# Patient Record
Sex: MALE | Race: WHITE | NOT HISPANIC OR LATINO | URBAN - METROPOLITAN AREA
[De-identification: names, ages, dates, MRNs, and addresses within clinical notes are randomized per-mention and may not be internally consistent; named-entity substitution may affect disease eponyms.]

---

## 2022-11-02 ENCOUNTER — WEB ENCOUNTER (OUTPATIENT)
Dept: URBAN - METROPOLITAN AREA CLINIC 63 | Facility: CLINIC | Age: 47
End: 2022-11-02

## 2022-11-02 ENCOUNTER — OFFICE VISIT (OUTPATIENT)
Dept: URBAN - METROPOLITAN AREA CLINIC 63 | Facility: CLINIC | Age: 47
End: 2022-11-02
Payer: COMMERCIAL

## 2022-11-02 VITALS
WEIGHT: 242 LBS | HEIGHT: 69 IN | HEART RATE: 84 BPM | TEMPERATURE: 98 F | DIASTOLIC BLOOD PRESSURE: 74 MMHG | BODY MASS INDEX: 35.84 KG/M2 | SYSTOLIC BLOOD PRESSURE: 130 MMHG | OXYGEN SATURATION: 97 %

## 2022-11-02 DIAGNOSIS — K50.90 CROHN'S DISEASE, UNSPECIFIED, WITHOUT COMPLICATIONS: ICD-10-CM

## 2022-11-02 PROCEDURE — 99204 OFFICE O/P NEW MOD 45 MIN: CPT | Performed by: NURSE PRACTITIONER

## 2022-11-02 RX ORDER — TRAZODONE HYDROCHLORIDE 150 MG/1
1 TABLET AT BEDTIME TABLET ORAL ONCE A DAY
Status: ACTIVE | COMMUNITY

## 2022-11-02 RX ORDER — FAMOTIDINE 20 MG/1
1 TABLET AT BEDTIME AS NEEDED TABLET, FILM COATED ORAL ONCE A DAY
Status: ACTIVE | COMMUNITY

## 2022-11-02 RX ORDER — ESOMEPRAZOLE MAGNESIUM 40 MG/1
1 CAPSULE CAPSULE, DELAYED RELEASE ORAL ONCE A DAY
Status: ACTIVE | COMMUNITY

## 2022-11-02 RX ORDER — INFLIXIMAB 100 MG/10ML
AS DIRECTED INJECTION, POWDER, LYOPHILIZED, FOR SOLUTION INTRAVENOUS
Status: ACTIVE | COMMUNITY

## 2022-11-02 NOTE — HPI-TODAY'S VISIT:
Thank you very much for kindly referring Yash Silverio, very pleasant 47-year-old male, to our service for Crohn's disease.  Past medical history significant for Crohn's disease, GERD and obesity.  Past surgical history significant for cervical fusion, shoulder surgery and his last complete colonoscopy was 2019.  His Crohn's is currently maintained with Remicade every 8 weeks and he is not sure of the dose.  Yash presents today having permanently relocated to this area and is wanting to establish with gastroenterology for ongoing treatment of Crohn's disease.  His last infusion was 19 August 2022 and he believes he is approximately 2 weeks past due.  Unfortunately, I do not have any medical records for review at the time of this office visit.  He was originally diagnosed with Crohn's in 2003 and states he had involvement, "from my mouth to anus", and was hospitalized at initial presentation and relates he also had SIBO.  He was started on Remicade and has been on it for the past 17 years with good efficacy.  He states his last flare was 2004, which also resulted in hospitalization, and symptoms consisted of chronic dyspepsia, diarrhea and bloody stools.  Today, he is asymptomatic from a GI perspective and relates 1-2 unremarkable bowel movements per day, typically in the morning after coffee.  He uses Nexium, 40 mg once daily with good efficacy and denies pyrosis, dysphagia, dyspepsia, abdominal pain, change in bowel habits, rectal bleeding, melena or unintentional weight loss.  Current regimen: Remicade, every 8 weeks Crohn's (2003) Last infusion, 19 AUG 2022

## 2022-11-03 PROBLEM — 34000006 CROHN'S DISEASE: Status: ACTIVE | Noted: 2022-11-02

## 2022-11-08 ENCOUNTER — TELEPHONE ENCOUNTER (OUTPATIENT)
Dept: URBAN - METROPOLITAN AREA CLINIC 23 | Facility: CLINIC | Age: 47
End: 2022-11-08

## 2022-11-20 LAB
A/G RATIO: 1.7
ALBUMIN: 4.7
ALKALINE PHOSPHATASE: 57
ALT (SGPT): 21
AST (SGOT): 16
BILIRUBIN, TOTAL: 0.5
BUN/CREATININE RATIO: (no result)
BUN: 13
C-REACTIVE PROTEIN, QUANT: 6.5
CALCIUM: 9.7
CARBON DIOXIDE, TOTAL: 23
CHLORIDE: 101
CREATININE: 1.08
EGFR: 86
GLOBULIN, TOTAL: 2.8
GLUCOSE: 90
HEMATOCRIT: 45.9
HEMOGLOBIN: 15.2
HEPATITIS B SURFACE ANTIGEN: (no result)
MCH: 27.4
MCHC: 33.1
MCV: 82.7
MITOGEN-NIL: >10
MPV: 9.4
NIL: 0.01
PLATELET COUNT: 267
POTASSIUM: 3.9
PROTEIN, TOTAL: 7.5
QUANTIFERON(R)-TB GOLD: NEGATIVE
RDW: 14.5
RED BLOOD CELL COUNT: 5.55
SODIUM: 138
TB1-NIL: 0
TB2-NIL: 0.01
WHITE BLOOD CELL COUNT: 12.8

## 2022-12-02 ENCOUNTER — OFFICE VISIT (OUTPATIENT)
Dept: URBAN - METROPOLITAN AREA CLINIC 63 | Facility: CLINIC | Age: 47
End: 2022-12-02

## 2022-12-08 ENCOUNTER — OFFICE VISIT (OUTPATIENT)
Dept: URBAN - METROPOLITAN AREA CLINIC 60 | Facility: CLINIC | Age: 47
End: 2022-12-08

## 2022-12-08 RX ORDER — TRAZODONE HYDROCHLORIDE 150 MG/1
1 TABLET AT BEDTIME TABLET ORAL ONCE A DAY
COMMUNITY

## 2022-12-08 RX ORDER — INFLIXIMAB 100 MG/10ML
AS DIRECTED INJECTION, POWDER, LYOPHILIZED, FOR SOLUTION INTRAVENOUS
COMMUNITY

## 2022-12-08 RX ORDER — ESOMEPRAZOLE MAGNESIUM 40 MG/1
1 CAPSULE CAPSULE, DELAYED RELEASE ORAL ONCE A DAY
COMMUNITY

## 2022-12-08 RX ORDER — FAMOTIDINE 20 MG/1
1 TABLET AT BEDTIME AS NEEDED TABLET, FILM COATED ORAL ONCE A DAY
COMMUNITY

## 2022-12-08 NOTE — HPI-PREVIOUS LABS
Lab work dated 20 November 2022 demonstrates the following abnormalities: WBC 12.8.  All remaining lab values of CBC, CMP, CRP (6.5), QuantiFERON-TB Gold and HBV surface antigen are negative, nonreactive or within normal limits.  Full results listed below.

## 2022-12-08 NOTE — HPI-TODAY'S VISIT:
Thank you very much for kindly referring Yash Silverio, very pleasant 47-year-old male, to our service for Crohn's disease.  Past medical history significant for Crohn's disease, GERD and obesity.  Past surgical history significant for cervical fusion, shoulder surgery and his last complete colonoscopy was 2019.  His Crohn's is currently maintained with Remicade every 8 weeks and he is not sure of the dose.  Yash presents today having permanently relocated to this area and is wanting to establish with gastroenterology for ongoing treatment of Crohn's disease.  His last infusion was 19 August 2022 and he believes he is approximately 2 weeks past due.  Unfortunately, I do not have any medical records for review at the time of this office visit.  He was originally diagnosed with Crohn's in 2003 and states he had involvement, "from my mouth to anus", and was hospitalized at initial presentation and relates he also had SIBO.  He was started on Remicade and has been on it for the past 17 years with good efficacy.  He states his last flare was 2004, which also resulted in hospitalization, and symptoms consisted of chronic dyspepsia, diarrhea and bloody stools.  Today, he is asymptomatic from a GI perspective and relates 1-2 unremarkable bowel movements per day, typically in the morning after coffee.  He uses Nexium, 40 mg once daily with good efficacy and denies pyrosis, dysphagia, dyspepsia, abdominal pain, change in bowel habits, rectal bleeding, melena or unintentional weight loss.  Current regimen: Remicade, every 8 weeks Crohn's (2003) Last infusion, 19 AUG 2022 .

## 2022-12-19 ENCOUNTER — OFFICE VISIT (OUTPATIENT)
Dept: URBAN - METROPOLITAN AREA CLINIC 63 | Facility: CLINIC | Age: 47
End: 2022-12-19

## 2022-12-19 RX ORDER — TRAZODONE HYDROCHLORIDE 150 MG/1
1 TABLET AT BEDTIME TABLET ORAL ONCE A DAY
COMMUNITY

## 2022-12-19 RX ORDER — ESOMEPRAZOLE MAGNESIUM 40 MG/1
1 CAPSULE CAPSULE, DELAYED RELEASE ORAL ONCE A DAY
COMMUNITY

## 2022-12-19 RX ORDER — FAMOTIDINE 20 MG/1
1 TABLET AT BEDTIME AS NEEDED TABLET, FILM COATED ORAL ONCE A DAY
COMMUNITY

## 2022-12-19 RX ORDER — INFLIXIMAB 100 MG/10ML
AS DIRECTED INJECTION, POWDER, LYOPHILIZED, FOR SOLUTION INTRAVENOUS
COMMUNITY

## 2022-12-19 NOTE — HPI-PREVIOUS LABS
Lab work dated 14 November 2022 demonstrates the following abnormalities: WBC 12.8.  All remaining lab values of CBC, CMP, CRP (6.5), QuantiFERON-TB gold plus and HBV surface antigen are negative or within normal limits (see full results below).

## 2022-12-19 NOTE — HPI-TODAY'S VISIT:
OLD: Thank you very much for kindly referring Yash Silverio, very pleasant 47-year-old male, to our service for Crohn's disease.  Past medical history significant for Crohn's disease, GERD and obesity.  Past surgical history significant for cervical fusion, shoulder surgery and his last complete colonoscopy was 2019.  His Crohn's is currently maintained with Remicade every 8 weeks and he is not sure of the dose.  Yash presents today having permanently relocated to this area and is wanting to establish with gastroenterology for ongoing treatment of Crohn's disease.  His last infusion was 19 August 2022 and he believes he is approximately 2 weeks past due.  Unfortunately, I do not have any medical records for review at the time of this office visit.  He was originally diagnosed with Crohn's in 2003 and states he had involvement, "from my mouth to anus", and was hospitalized at initial presentation and relates he also had SIBO.  He was started on Remicade and has been on it for the past 17 years with good efficacy.  He states his last flare was 2004, which also resulted in hospitalization, and symptoms consisted of chronic dyspepsia, diarrhea and bloody stools.  Today, he is asymptomatic from a GI perspective and relates 1-2 unremarkable bowel movements per day, typically in the morning after coffee.  He uses Nexium, 40 mg once daily with good efficacy and denies pyrosis, dysphagia, dyspepsia, abdominal pain, change in bowel habits, rectal bleeding, melena or unintentional weight loss.  Current regimen: Remicade, 5 mg/kg, every 8 weeks Crohn's (2003) Last infusion, 19 AUG 2022 . .

## 2023-01-18 ENCOUNTER — TELEPHONE ENCOUNTER (OUTPATIENT)
Dept: URBAN - METROPOLITAN AREA CLINIC 63 | Facility: CLINIC | Age: 48
End: 2023-01-18

## 2023-07-13 ENCOUNTER — TELEPHONE ENCOUNTER (OUTPATIENT)
Dept: URBAN - METROPOLITAN AREA CLINIC 64 | Facility: CLINIC | Age: 48
End: 2023-07-13

## 2023-08-14 ENCOUNTER — OFFICE VISIT (OUTPATIENT)
Dept: URBAN - METROPOLITAN AREA CLINIC 63 | Facility: CLINIC | Age: 48
End: 2023-08-14

## 2023-08-14 RX ORDER — ESOMEPRAZOLE MAGNESIUM 40 MG/1
1 CAPSULE CAPSULE, DELAYED RELEASE ORAL ONCE A DAY
COMMUNITY

## 2023-08-14 RX ORDER — FAMOTIDINE 20 MG/1
1 TABLET AT BEDTIME AS NEEDED TABLET, FILM COATED ORAL ONCE A DAY
COMMUNITY

## 2023-08-14 RX ORDER — TRAZODONE HYDROCHLORIDE 150 MG/1
1 TABLET AT BEDTIME TABLET ORAL ONCE A DAY
COMMUNITY

## 2023-08-14 RX ORDER — INFLIXIMAB 100 MG/10ML
AS DIRECTED INJECTION, POWDER, LYOPHILIZED, FOR SOLUTION INTRAVENOUS
COMMUNITY

## 2023-08-14 NOTE — HPI-TODAY'S VISIT:
OLD: Thank you very much for kindly referring Yash Silverio, very pleasant 47-year-old male, to our service for Crohn's disease.  Past medical history significant for Crohn's disease, GERD and obesity.  Past surgical history significant for cervical fusion, shoulder surgery and his last complete colonoscopy was 2019.  His Crohn's is currently maintained with Remicade every 8 weeks and he is not sure of the dose.  Yash presents today having permanently relocated to this area and is wanting to establish with gastroenterology for ongoing treatment of Crohn's disease.  His last infusion was 19 August 2022 and he believes he is approximately 2 weeks past due.  Unfortunately, I do not have any medical records for review at the time of this office visit.  He was originally diagnosed with Crohn's in 2003 and states he had involvement, "from my mouth to anus", and was hospitalized at initial presentation and relates he also had SIBO.  He was started on Remicade and has been on it for the past 17 years with good efficacy.  He states his last flare was 2004, which also resulted in hospitalization, and symptoms consisted of chronic dyspepsia, diarrhea and bloody stools.  Today, he is asymptomatic from a GI perspective and relates 1-2 unremarkable bowel movements per day, typically in the morning after coffee.  He uses Nexium, 40 mg once daily with good efficacy and denies pyrosis, dysphagia, dyspepsia, abdominal pain, change in bowel habits, rectal bleeding, melena or unintentional weight loss.  Current regimen: Remicade, 5 mg/kg, every 8 weeks Crohn's (2003) Last infusion, 19 AUG 2022 . . .

## 2023-08-14 NOTE — HPI-PREVIOUS LABS
Lab work dated 14 November 2022 demonstrates the following abnormalities: WBC 12.8.  All remaining lab values of CBC, CMP, CRP (6.5), QuantiFERON-TB gold plus and HBV surface antigen are negative or within normal limits (see full results below). .

## 2023-09-06 ENCOUNTER — LAB OUTSIDE AN ENCOUNTER (OUTPATIENT)
Dept: URBAN - METROPOLITAN AREA CLINIC 63 | Facility: CLINIC | Age: 48
End: 2023-09-06

## 2023-09-07 LAB
A/G RATIO: 1.9
ABSOLUTE BASOPHILS: 56
ABSOLUTE EOSINOPHILS: 89
ABSOLUTE LYMPHOCYTES: 3064
ABSOLUTE MONOCYTES: 633
ABSOLUTE NEUTROPHILS: 7259
ALBUMIN: 4.3
ALKALINE PHOSPHATASE: 52
ALT (SGPT): 15
AST (SGOT): 14
BASOPHILS: 0.5
BILIRUBIN, TOTAL: 0.4
BUN/CREATININE RATIO: (no result)
BUN: 15
C-REACTIVE PROTEIN, QUANT: 2.9
CALCIUM: 9.3
CARBON DIOXIDE, TOTAL: 27
CHLORIDE: 103
CREATININE: 1.09
EGFR: 84
EOSINOPHILS: 0.8
GLOBULIN, TOTAL: 2.3
GLUCOSE: 167
HEMATOCRIT: 42.7
HEMOGLOBIN: 14.7
LYMPHOCYTES: 27.6
MCH: 29.2
MCHC: 34.4
MCV: 84.7
MONOCYTES: 5.7
MPV: 9.3
NEUTROPHILS: 65.4
PLATELET COUNT: 239
POTASSIUM: 3.6
PROTEIN, TOTAL: 6.6
RDW: 14.9
RED BLOOD CELL COUNT: 5.04
SODIUM: 137
WHITE BLOOD CELL COUNT: 11.1

## 2023-09-21 LAB — CALPROTECTIN, FECAL: 37

## 2023-10-09 ENCOUNTER — OFFICE VISIT (OUTPATIENT)
Dept: URBAN - METROPOLITAN AREA CLINIC 63 | Facility: CLINIC | Age: 48
End: 2023-10-09
Payer: COMMERCIAL

## 2023-10-09 VITALS
OXYGEN SATURATION: 97 % | HEART RATE: 80 BPM | TEMPERATURE: 98.3 F | WEIGHT: 229.8 LBS | DIASTOLIC BLOOD PRESSURE: 82 MMHG | BODY MASS INDEX: 34.04 KG/M2 | SYSTOLIC BLOOD PRESSURE: 124 MMHG | HEIGHT: 69 IN

## 2023-10-09 DIAGNOSIS — K50.90 CROHN'S DISEASE, UNSPECIFIED, WITHOUT COMPLICATIONS: ICD-10-CM

## 2023-10-09 PROCEDURE — 99214 OFFICE O/P EST MOD 30 MIN: CPT | Performed by: NURSE PRACTITIONER

## 2023-10-09 RX ORDER — ESOMEPRAZOLE MAGNESIUM 40 MG/1
1 CAPSULE CAPSULE, DELAYED RELEASE ORAL ONCE A DAY
Status: ACTIVE | COMMUNITY

## 2023-10-09 RX ORDER — INFLIXIMAB 100 MG/10ML
AS DIRECTED INJECTION, POWDER, LYOPHILIZED, FOR SOLUTION INTRAVENOUS
Status: ACTIVE | COMMUNITY

## 2023-10-09 RX ORDER — ONDANSETRON HYDROCHLORIDE 4 MG/1
1 TABLET TABLET, FILM COATED ORAL
Qty: 2 | Refills: 0 | OUTPATIENT
Start: 2023-10-09

## 2023-10-09 RX ORDER — TRAZODONE HYDROCHLORIDE 150 MG/1
1 TABLET AT BEDTIME TABLET ORAL ONCE A DAY
Status: ACTIVE | COMMUNITY

## 2023-10-09 NOTE — HPI-PREVIOUS IMAGING
CT abdomen and pelvis with contrast/29 November 2022. 1. Severe acute diverticulitis of the descending colon. No extraluminal gas or abscess. 2. Indeterminate nodular hyperattenuating focus associated with the skin in the supraumbilical portion of the anterior abdominal wall, for which correlation with direct inspection is requested.

## 2023-10-09 NOTE — HPI-PREVIOUS LABS
Lab work dated 06 September 2023 demonstrates the following abnormalities: Glucose 167.  All remaining lab values of CMP and CRP (2.9) are within normal limits.  Full results listed below. ********** Lab work dated 02 June 2023 demonstrates the following abnormalities: , non-, glucose 105, hemoglobin A1c 5.7%. All remaining lab values of CMP and lipid panel are within normal limits. ********** Lab work dated 20 November 2022 demonstrates the following abnormalities: WBC 12.8. All remaining lab values of CBC, CMP, CRP (6.5), QuantiFERON-TB Gold and HBV surface antigen are negative, nonreactive or within normal limits. Full results listed below.

## 2023-10-16 ENCOUNTER — LAB OUTSIDE AN ENCOUNTER (OUTPATIENT)
Dept: URBAN - METROPOLITAN AREA CLINIC 63 | Facility: CLINIC | Age: 48
End: 2023-10-16

## 2023-11-17 ENCOUNTER — CLAIMS CREATED FROM THE CLAIM WINDOW (OUTPATIENT)
Dept: URBAN - METROPOLITAN AREA CLINIC 4 | Facility: CLINIC | Age: 48
End: 2023-11-17
Payer: COMMERCIAL

## 2023-11-17 ENCOUNTER — CLAIMS CREATED FROM THE CLAIM WINDOW (OUTPATIENT)
Dept: URBAN - METROPOLITAN AREA SURGERY CENTER 4 | Facility: SURGERY CENTER | Age: 48
End: 2023-11-17
Payer: COMMERCIAL

## 2023-11-17 DIAGNOSIS — K64.1 SECOND DEGREE HEMORRHOIDS: ICD-10-CM

## 2023-11-17 DIAGNOSIS — K63.5 BENIGN COLONIC POLYP: ICD-10-CM

## 2023-11-17 DIAGNOSIS — K22.89 OTHER SPECIFIED DISEASE OF ESOPHAGUS: ICD-10-CM

## 2023-11-17 DIAGNOSIS — K29.70 GASTRITIS WITHOUT BLEEDING, UNSPECIFIED CHRONICITY, UNSPECIFIED GASTRITIS TYPE: ICD-10-CM

## 2023-11-17 DIAGNOSIS — K63.5 POLYP OF SIGMOID COLON, UNSPECIFIED TYPE: ICD-10-CM

## 2023-11-17 DIAGNOSIS — K29.70 GASTRITIS: ICD-10-CM

## 2023-11-17 DIAGNOSIS — K29.70 GASTRITIS, UNSPECIFIED, WITHOUT BLEEDING: ICD-10-CM

## 2023-11-17 DIAGNOSIS — K31.89 OTHER DISEASES OF STOMACH AND DUODENUM: ICD-10-CM

## 2023-11-17 DIAGNOSIS — K50.90 CROHN'S DISEASE WITHOUT COMPLICATION, UNSPECIFIED GASTROINTESTINAL TRACT LOCATION: ICD-10-CM

## 2023-11-17 DIAGNOSIS — K21.9 ESOPHAGEAL REFLUX: ICD-10-CM

## 2023-11-17 DIAGNOSIS — K50.90 CROHN'S DISEASE: ICD-10-CM

## 2023-11-17 PROCEDURE — 88312 SPECIAL STAINS GROUP 1: CPT | Performed by: PATHOLOGY

## 2023-11-17 PROCEDURE — 45385 COLONOSCOPY W/LESION REMOVAL: CPT | Performed by: INTERNAL MEDICINE

## 2023-11-17 PROCEDURE — 88305 TISSUE EXAM BY PATHOLOGIST: CPT | Performed by: PATHOLOGY

## 2023-11-17 PROCEDURE — 00813 ANES UPR LWR GI NDSC PX: CPT | Performed by: NURSE ANESTHETIST, CERTIFIED REGISTERED

## 2023-11-17 PROCEDURE — 43239 EGD BIOPSY SINGLE/MULTIPLE: CPT | Performed by: INTERNAL MEDICINE

## 2023-11-17 PROCEDURE — 45380 COLONOSCOPY AND BIOPSY: CPT | Performed by: INTERNAL MEDICINE

## 2023-11-17 RX ORDER — ESOMEPRAZOLE MAGNESIUM 40 MG/1
1 CAPSULE CAPSULE, DELAYED RELEASE ORAL ONCE A DAY
Status: ACTIVE | COMMUNITY

## 2023-11-17 RX ORDER — TRAZODONE HYDROCHLORIDE 150 MG/1
1 TABLET AT BEDTIME TABLET ORAL ONCE A DAY
Status: ACTIVE | COMMUNITY

## 2023-11-17 RX ORDER — INFLIXIMAB 100 MG/10ML
AS DIRECTED INJECTION, POWDER, LYOPHILIZED, FOR SOLUTION INTRAVENOUS
Status: ACTIVE | COMMUNITY

## 2023-11-17 RX ORDER — ONDANSETRON HYDROCHLORIDE 4 MG/1
1 TABLET TABLET, FILM COATED ORAL
Qty: 2 | Refills: 0 | Status: ACTIVE | COMMUNITY
Start: 2023-10-09

## 2023-12-01 ENCOUNTER — OFFICE VISIT (OUTPATIENT)
Dept: URBAN - METROPOLITAN AREA CLINIC 63 | Facility: CLINIC | Age: 48
End: 2023-12-01
Payer: COMMERCIAL

## 2023-12-01 ENCOUNTER — DASHBOARD ENCOUNTERS (OUTPATIENT)
Age: 48
End: 2023-12-01

## 2023-12-01 VITALS
BODY MASS INDEX: 34.66 KG/M2 | WEIGHT: 234 LBS | SYSTOLIC BLOOD PRESSURE: 124 MMHG | HEART RATE: 76 BPM | OXYGEN SATURATION: 97 % | HEIGHT: 69 IN | TEMPERATURE: 97.4 F | DIASTOLIC BLOOD PRESSURE: 80 MMHG

## 2023-12-01 DIAGNOSIS — K64.1 GRADE II HEMORRHOIDS: ICD-10-CM

## 2023-12-01 DIAGNOSIS — K50.90 CROHN'S DISEASE, UNSPECIFIED, WITHOUT COMPLICATIONS: ICD-10-CM

## 2023-12-01 DIAGNOSIS — K21.00 GASTRO-ESOPHAGEAL REFLUX DISEASE WITH ESOPHAGITIS, WITHOUT BLEEDING: ICD-10-CM

## 2023-12-01 DIAGNOSIS — K22.70 BARRETT'S ESOPHAGUS WITHOUT DYSPLASIA: ICD-10-CM

## 2023-12-01 PROBLEM — 302914006: Status: ACTIVE | Noted: 2023-12-01

## 2023-12-01 PROBLEM — 266433003: Status: ACTIVE | Noted: 2023-12-01

## 2023-12-01 PROCEDURE — 99214 OFFICE O/P EST MOD 30 MIN: CPT | Performed by: NURSE PRACTITIONER

## 2023-12-01 RX ORDER — INFLIXIMAB 100 MG/10ML
AS DIRECTED INJECTION, POWDER, LYOPHILIZED, FOR SOLUTION INTRAVENOUS
Status: ACTIVE | COMMUNITY

## 2023-12-01 RX ORDER — ESOMEPRAZOLE MAGNESIUM 40 MG/1
1 CAPSULE CAPSULE, DELAYED RELEASE ORAL ONCE A DAY
Status: ACTIVE | COMMUNITY

## 2023-12-01 RX ORDER — TRAZODONE HYDROCHLORIDE 150 MG/1
1 TABLET AT BEDTIME TABLET ORAL ONCE A DAY
Status: ACTIVE | COMMUNITY

## 2023-12-01 NOTE — HPI-PREVIOUS PROCEDURES
EGD/17 November 2023.  Esophageal mucosal changes consistent with short segment Ruiz's esophagus, classified as stage C0-M1 per [place] criteria, present in the lower third.  Mild gastritis found in the gastric antrum.  Unremarkable duodenum.  Pathology demonstrates mild chemical-reactive gastropathy in the antral biopsy and Ruiz's esophagus, negative for dysplasia or malignancy confirmed in the lower third esophageal biopsy.  All remaining findings are negative or benign.  Recommend chronic acid suppression and repeat EGD in 1 year due to new finding of Ruiz's esophagus without dysplasia. ********** Colonoscopy/17 November 2023.  Mucosal changes were found in the entire examined colon secondary to quiescent Crohn's disease.  8 mm tubular adenomatous polyp removed from the mid sigmoid colon.  Internal hemorrhoids present.  Serial biopsies from the ascending colon to the rectum all demonstrate no significant abnormality and no evidence of active Crohn's disease.  All remaining findings are negative or benign.  Recommend continue current regimen and repeat colonoscopy in 2 years for surveillance based on a history of Crohn's disease.

## 2023-12-01 NOTE — PHYSICAL EXAM SKIN:
no rashes , suprapubic and perineal boils/folliculitis, no areas of discoloration , no jaundice present , good turgor , no masses , no tenderness on palpation

## 2023-12-01 NOTE — HPI-TODAY'S VISIT:
Job Silevrio (Jeff) is a very pleasant 48-year-old male seen in follow-up of EGD and colonoscopy (see results below).  He admits to tolerating the procedure very easily without any postprocedure complications.  His last Remicade infusion was 16 November 2023 and he is on an 8-week regimen at 5 mg/kg.  His Crohn's disease appears to be well controlled and his bowel habits are unremarkable, averaging 1-2/day of formed brown stool.  He currently uses esomeprazole, 40 mg, once daily with otherwise good efficacy.  He does complain of other non-GI related issues such as frequent boils in his pubic and perineal area that has not been evaluated by dermatology.  He also relates he is "struggling" with some psychological issues and relates that his psychologist of 10+ years has recently moved away.  He has not found anyone for counseling since her departure.  ********** Current regimen: Remicade, 5 mg/kg, every 8 weeks Crohn's (2003) Last infusion: 20 SEP 2023 Next infusion: 16 NOV 2023.

## 2023-12-09 NOTE — HPI-TODAY'S VISIT:
Thank you very much for kindly referring Yash Silverio, a very pleasant 48-year-old male, back to our service in follow-up Remicade infusion secondary to Crohn's.  Past medical history significant for Crohn's disease, GERD and obesity. Past surgical history significant for cervical fusion, shoulder surgery and his last EGD and complete colonoscopy was 2018. His Crohn's is currently maintained with Remicade, 5 mg/kg every 8 weeks.  He denies a family history of colon polyps, colon cancer or other GI malignancy.  Yash presents today without gastrointestinal complaint and admits to 1-2 unremarkable bowel movements per day of soft brown stool.  His last Remicade infusion was 20 September 2023 and he denies any history of infusion reaction.  He denies pyrosis, dysphagia, dyspepsia, abdominal pain, change in bowel habits, rectal bleeding, melena or unintentional weight loss. ********** Current regimen: Remicade, 5 mg/kg, every 8 weeks Crohn's (2003) Last infusion: 20 SEP 2023 Next infusion: 16 NOV 2023.
No

## 2024-11-01 ENCOUNTER — LAB OUTSIDE AN ENCOUNTER (OUTPATIENT)
Dept: URBAN - METROPOLITAN AREA CLINIC 63 | Facility: CLINIC | Age: 49
End: 2024-11-01

## 2025-01-22 ENCOUNTER — OFFICE VISIT (OUTPATIENT)
Dept: URBAN - METROPOLITAN AREA CLINIC 63 | Facility: CLINIC | Age: 50
End: 2025-01-22
Payer: COMMERCIAL

## 2025-01-22 ENCOUNTER — LAB OUTSIDE AN ENCOUNTER (OUTPATIENT)
Dept: URBAN - METROPOLITAN AREA CLINIC 63 | Facility: CLINIC | Age: 50
End: 2025-01-22

## 2025-01-22 VITALS
TEMPERATURE: 97.5 F | BODY MASS INDEX: 34.66 KG/M2 | HEIGHT: 69 IN | SYSTOLIC BLOOD PRESSURE: 120 MMHG | DIASTOLIC BLOOD PRESSURE: 80 MMHG | HEART RATE: 76 BPM | WEIGHT: 234 LBS | OXYGEN SATURATION: 96 %

## 2025-01-22 DIAGNOSIS — Z12.11 COLON CANCER SCREENING: ICD-10-CM

## 2025-01-22 DIAGNOSIS — K50.90 ABDOMINAL PAIN DESPITE THERAPY FOR CROHN'S DISEASE: ICD-10-CM

## 2025-01-22 DIAGNOSIS — K21.9 ACID REFLUX: ICD-10-CM

## 2025-01-22 DIAGNOSIS — K22.70 BARRETT'S ESOPHAGUS WITHOUT DYSPLASIA: ICD-10-CM

## 2025-01-22 PROBLEM — 235595009: Status: ACTIVE | Noted: 2025-01-22

## 2025-01-22 PROBLEM — 34000006: Status: ACTIVE | Noted: 2025-01-22

## 2025-01-22 PROCEDURE — 99214 OFFICE O/P EST MOD 30 MIN: CPT

## 2025-01-22 RX ORDER — INFLIXIMAB 100 MG/10ML
AS DIRECTED INJECTION, POWDER, LYOPHILIZED, FOR SOLUTION INTRAVENOUS
Status: ACTIVE | COMMUNITY

## 2025-01-22 RX ORDER — ESOMEPRAZOLE MAGNESIUM 40 MG/1
1 CAPSULE CAPSULE, DELAYED RELEASE ORAL ONCE A DAY
Status: ACTIVE | COMMUNITY

## 2025-01-22 RX ORDER — TRAZODONE HYDROCHLORIDE 150 MG/1
1 TABLET AT BEDTIME TABLET ORAL ONCE A DAY
Status: ACTIVE | COMMUNITY

## 2025-01-22 NOTE — HPI-PREVIOUS PROCEDURES
EGD/17 November 2023.  Esophageal mucosal changes consistent with short segment Ruiz's esophagus, classified as stage C0-M1 per [place] criteria, present in the lower third.  Mild gastritis found in the gastric antrum.  Unremarkable duodenum.  Pathology demonstrates mild chemical-reactive gastropathy in the antral biopsy and Ruiz's esophagus, negative for dysplasia or malignancy confirmed in the lower third esophageal biopsy.  All remaining findings are negative or benign.  Recommend chronic acid suppression and repeat EGD in 1 year due to new finding of Ruiz's esophagus without dysplasia. ********** Colonoscopy/17 November 2023.  Mucosal changes were found in the entire examined colon secondary to quiescent Crohn's disease.  8 mm tubular adenomatous polyp removed from the mid sigmoid colon.  Internal hemorrhoids present.  Serial biopsies from the ascending colon to the rectum all demonstrate no significant abnormality and no evidence of active Crohn's disease.  All remaining findings are negative or benign.  Recommend continue current regimen and repeat colonoscopy in 2 years for surveillance based on a history of Crohn's disease.

## 2025-01-22 NOTE — HPI-CROHN'S DISEASE
When diagnosed: 2003 Last CRP, fecal calprotectiin, CBC, and CMP done: ordered today Last colonoscoopy (q2y after 8ys of disease): 11/17/2023 Last endoscopy: 11/17/2023 Personal cancer history: none Family history of GI cancer: paternal skin cancer, no familial GI cancers Current IBD med: Remicade, q8w, 5mg/kg Prior IBD meds (failed): N/A Last HepB and Quantiferon gold done: ordered today Steroid use/response: prednisone (good response) Last CT/MR enterography: N/A IBD surgical history: none Cardiac history: none

## 2025-01-22 NOTE — PHYSICAL EXAM GASTROINTESTINAL
Abdomen , soft, nontender, nondistended , no guarding or rigidity , no masses palpable , normal bowel sounds , Liver and Spleen , no hepatomegaly present , no hepatosplenomegaly , liver nontender , spleen not palpable ABDOMENAL EXAM: ONE NODULAR, SCARRED LESION EXPRESSING SEROUS TYPE FLUID. SEVERAL HEALED SCARS OF PAST NODULAR SKIN LESIONS AROUND SUPRAPUBIC AREA. PERIANAL EXAM: SEVERAL SCARRED OF SIMILAR LESIONS AROUND GROIN, AND AROUND LOWER GLUEAL CLEFT AND INNER THIGH

## 2025-01-22 NOTE — HPI-TODAY'S VISIT:
This is a very pleasant 49-year-old male who presents to the office with a chief complaint of "Crohn's disease".  Past medical history is significant for Crohn's disease, GERD, Ruiz's esophagus, history of anal fissure.  Past surgical history is significant for cervical fusion, shoulder surgery, colonoscopy.  Family history is significant for paternal skin cancer and sister with IBS. Last colonoscopy 11/17/2023, Dr. Almeida, 2-year recall Last endoscopy, 11/17/2023, Dr. Almeida, 3-year recall Cardiologist: None . Patient was last seen in the office on 12/1/2023 with JR Brantley for an EGD and colonoscopy follow-up.  At that visit, patient had tolerated procedure well without complications.  He was taking Remicade, every 6 weeks, 5 mg/kg, he endorsed his Crohn's symptoms to be well-controlled.  Additionally he was taking esomeprazole 40 mg daily. . Patient presents today explaining he is doing well on his Remicade, he explains that he continues to take it every 8 weeks via IV infusion.  He explains that well it controls his Crohn's symptoms well he feels that it "wiped him out" every time he gets an infusion.  In addition he explains that he has been following with his dermatologist for "boils" that he will get on his suprapubic area and groin.  He explains that these "boils" will swell up with fluid and spontaneously express.  He explains that these nodules are painful, and explains that in 1 instance 1 got infected in which she had a fever and had to have emergent care for it.  He explains that his dermatologist worked him up and everything is come back negative, he explains that his dermatologist explained that it could be the Remicade causing skin issues, and recommended he try a different medication.  I explained the patient that he is due for colonoscopy, I explained that we will complete this first and complete standard IBD labs before switching medications, he explains that he would prefer either an automatic pen injector over IV infusions.  I explained that when he follows up after colonoscopy and lab work we can discuss options.  Patient is agreeable.  Otherwise he is doing well and has no other GI complaints today. . Patient denies abdominal pain, belching, bloating, constipation, diarrhea, dysphagia, pyrosis, melena, hematochezia, hematemesis, nausea, vomiting, BRBPR, and unintentional weight loss.

## 2025-02-06 ENCOUNTER — TELEPHONE ENCOUNTER (OUTPATIENT)
Dept: URBAN - METROPOLITAN AREA CLINIC 63 | Facility: CLINIC | Age: 50
End: 2025-02-06

## 2025-02-13 ENCOUNTER — LAB OUTSIDE AN ENCOUNTER (OUTPATIENT)
Dept: URBAN - METROPOLITAN AREA CLINIC 63 | Facility: CLINIC | Age: 50
End: 2025-02-13

## 2025-02-18 LAB
A/G RATIO: 1.7
ABSOLUTE BASOPHILS: 58
ABSOLUTE EOSINOPHILS: 97
ABSOLUTE LYMPHOCYTES: 3308
ABSOLUTE MONOCYTES: 708
ABSOLUTE NEUTROPHILS: 5529
ALBUMIN: 4.5
ALKALINE PHOSPHATASE: 59
ALT (SGPT): 22
AST (SGOT): 19
BASOPHILS: 0.6
BILIRUBIN, TOTAL: 0.4
BUN/CREATININE RATIO: (no result)
BUN: 15
C-REACTIVE PROTEIN, QUANT: 6.7
CALCIUM: 9.4
CARBON DIOXIDE, TOTAL: 27
CHLORIDE: 102
CREATININE: 1.02
EGFR: 91
EOSINOPHILS: 1
GLOBULIN, TOTAL: 2.6
GLUCOSE: 106
HEMATOCRIT: 44.7
HEMOGLOBIN: 14.9
HEPATITIS B SURFACE ANTIGEN: (no result)
LYMPHOCYTES: 34.1
MCH: 28.8
MCHC: 33.3
MCV: 86.5
MONOCYTES: 7.3
MPV: 9.8
NEUTROPHILS: 57
PLATELET COUNT: 240
POTASSIUM: 4.2
PROTEIN, TOTAL: 7.1
RDW: 14.6
RED BLOOD CELL COUNT: 5.17
SODIUM: 137
WHITE BLOOD CELL COUNT: 9.7

## 2025-05-15 ENCOUNTER — OFFICE VISIT (OUTPATIENT)
Dept: URBAN - METROPOLITAN AREA SURGERY CENTER 4 | Facility: SURGERY CENTER | Age: 50
End: 2025-05-15

## 2025-05-30 ENCOUNTER — OFFICE VISIT (OUTPATIENT)
Dept: URBAN - METROPOLITAN AREA CLINIC 63 | Facility: CLINIC | Age: 50
End: 2025-05-30

## 2025-08-21 ENCOUNTER — CLAIMS CREATED FROM THE CLAIM WINDOW (OUTPATIENT)
Dept: URBAN - METROPOLITAN AREA SURGERY CENTER 4 | Facility: SURGERY CENTER | Age: 50
End: 2025-08-21

## 2025-08-21 ENCOUNTER — CLAIMS CREATED FROM THE CLAIM WINDOW (OUTPATIENT)
Dept: URBAN - METROPOLITAN AREA SURGERY CENTER 4 | Facility: SURGERY CENTER | Age: 50
End: 2025-08-21
Payer: COMMERCIAL

## 2025-08-21 ENCOUNTER — CLAIMS CREATED FROM THE CLAIM WINDOW (OUTPATIENT)
Dept: URBAN - METROPOLITAN AREA CLINIC 4 | Facility: CLINIC | Age: 50
End: 2025-08-21
Payer: COMMERCIAL

## 2025-08-21 DIAGNOSIS — K63.5 COLON POLYP: ICD-10-CM

## 2025-08-21 DIAGNOSIS — K63.89 OTHER SPECIFIED DISEASES OF INTESTINE: ICD-10-CM

## 2025-08-21 DIAGNOSIS — K51.80 OTHER ULCERATIVE COLITIS WITHOUT COMPLICATIONS: ICD-10-CM

## 2025-08-21 DIAGNOSIS — K50.10 CROHN'S DISEASE OF LARGE INTESTINE WITHOUT COMPLICATIONS: ICD-10-CM

## 2025-08-21 DIAGNOSIS — K64.1 SECOND DEGREE HEMORRHOIDS: ICD-10-CM

## 2025-08-21 DIAGNOSIS — D12.2 BENIGN NEOPLASM OF ASCENDING COLON: ICD-10-CM

## 2025-08-21 PROCEDURE — 45385 COLONOSCOPY W/LESION REMOVAL: CPT | Performed by: INTERNAL MEDICINE

## 2025-08-21 PROCEDURE — 88305 TISSUE EXAM BY PATHOLOGIST: CPT | Performed by: PATHOLOGY

## 2025-08-21 PROCEDURE — 45380 COLONOSCOPY AND BIOPSY: CPT | Performed by: INTERNAL MEDICINE

## 2025-08-21 RX ORDER — ESOMEPRAZOLE MAGNESIUM 40 MG/1
1 CAPSULE CAPSULE, DELAYED RELEASE ORAL ONCE A DAY
Status: ACTIVE | COMMUNITY

## 2025-08-21 RX ORDER — INFLIXIMAB 100 MG/10ML
AS DIRECTED INJECTION, POWDER, LYOPHILIZED, FOR SOLUTION INTRAVENOUS
Status: ACTIVE | COMMUNITY

## 2025-08-21 RX ORDER — TRAZODONE HYDROCHLORIDE 150 MG/1
1 TABLET AT BEDTIME TABLET ORAL ONCE A DAY
Status: ACTIVE | COMMUNITY